# Patient Record
Sex: FEMALE
[De-identification: names, ages, dates, MRNs, and addresses within clinical notes are randomized per-mention and may not be internally consistent; named-entity substitution may affect disease eponyms.]

---

## 2023-05-09 PROBLEM — Z00.00 ENCOUNTER FOR PREVENTIVE HEALTH EXAMINATION: Status: ACTIVE | Noted: 2023-05-09

## 2023-05-10 ENCOUNTER — NON-APPOINTMENT (OUTPATIENT)
Age: 33
End: 2023-05-10

## 2023-05-11 ENCOUNTER — APPOINTMENT (OUTPATIENT)
Dept: OTOLARYNGOLOGY | Facility: CLINIC | Age: 33
End: 2023-05-11
Payer: COMMERCIAL

## 2023-05-11 ENCOUNTER — NON-APPOINTMENT (OUTPATIENT)
Age: 33
End: 2023-05-11

## 2023-05-11 VITALS
TEMPERATURE: 97.6 F | WEIGHT: 206 LBS | SYSTOLIC BLOOD PRESSURE: 127 MMHG | HEART RATE: 81 BPM | HEIGHT: 66 IN | BODY MASS INDEX: 33.11 KG/M2 | DIASTOLIC BLOOD PRESSURE: 91 MMHG

## 2023-05-11 DIAGNOSIS — J35.8 OTHER CHRONIC DISEASES OF TONSILS AND ADENOIDS: ICD-10-CM

## 2023-05-11 DIAGNOSIS — L29.9 PRURITUS, UNSPECIFIED: ICD-10-CM

## 2023-05-11 PROCEDURE — 99204 OFFICE O/P NEW MOD 45 MIN: CPT

## 2023-05-11 RX ORDER — MOMETASONE FUROATE 1 MG/ML
0.1 SOLUTION TOPICAL
Qty: 1 | Refills: 2 | Status: ACTIVE | COMMUNITY
Start: 2023-05-11 | End: 1900-01-01

## 2023-05-11 NOTE — ASSESSMENT
[FreeTextEntry1] : 33 year old female presents with concern for ear itch and tonsilliths. On exam, ears appeared normal and tonsils were 1+ bilaterally and cryptic in nature. Based on her history and exam findings, I am recommending stopping Q tip use and Mometasone use as needed for ear itch. In regards to her tonsil stones, she reports they are non bothersome and deferred any surgical intervention. I am recommending Water Pik use. Follow up if ear and throat symptoms persist or worsen. \par \par - stop Q tip use\par - Mometasone otic drops PRN\par - Water Pik for tonsilliths \par - follow up if symptoms persist or worsen \par

## 2023-05-11 NOTE — PHYSICAL EXAM
[] : septum deviated to the right [Midline] : trachea located in midline position [Normal] : no rashes [de-identified] : 2+/4bilaterally and cryptic in nature

## 2023-05-11 NOTE — HISTORY OF PRESENT ILLNESS
[de-identified] : 5/11/23\par 33F presents with bilateral ear itch for about 10 months. Symptoms pretty constant throughout the day. Occasional discomfort and flaking of the skin. No hx of eczema. No otorrhea, tinnitus, hearing changes or vertiginous symptoms. + q tip use. She tried using clotrimazole thinking this was a fungal infection which did not help. \par \par She also reports chronic tonsil stones. Spits up 2-3 x a month. + halitosis. Denies difficulty chewing, eating or swallowing.